# Patient Record
Sex: MALE | Race: WHITE | Employment: FULL TIME | URBAN - NONMETROPOLITAN AREA
[De-identification: names, ages, dates, MRNs, and addresses within clinical notes are randomized per-mention and may not be internally consistent; named-entity substitution may affect disease eponyms.]

---

## 2019-04-29 NOTE — PROGRESS NOTES
PAT call attempted patient unavailable left message with instructions    NPO after midnight  Bring insurance info and drivers license  Wear comfortable clean clothing  Do not bring jewelry   Shower night before and morning of surgery with a liquid antibacterial soap  Bring medications in original bottles  Follow all instructions give by your physician   needed at discharge  Call [phone] for any questions

## 2019-05-01 NOTE — PROGRESS NOTES
Received pre op testing results ; I left message for MA making sure Dr. Chi Barr was aware of A1C of 12.0 4/22/19. Thank you.

## 2019-05-04 NOTE — H&P
[address] [id number]                       PREOPERATIVE HISTORY AND PHYSICAL    PATIENT NAME: Lenox Fothergill                      :        1975  MED REC NO:   [id number]                           ROOM:  ACCOUNT NO:   [de-identified]                           ADMIT DATE: 2019  PROVIDER:     Leanna Winn PA-C    This is a patient of Dr. Uvaldo Charles, who will be undergoing surgery on  2019 at [address]. He will undergoing anterior  cervical discectomy and inner body arthroplasty of C6-C7. CHIEF COMPLAINT:  Cervical pian. HISTORY OF PRESENT ILLNESS:  The patient is a 70-year-old male, who  presented to the office with symptoms of neck pain that radiates into  the shoulder and posterior upper arm, down the forearm into the hand,  and left hand side. He is right-hand dominant, but uses the left hand  very frequently. He has noticed decrease in his functional abilities  with these left upper extremity symptoms. He does report associated  numbness and tingling and pain, as well as weakness with picking up his  children. He was worked up with an MRI of the cervical spine, which did  show a large broad-based midline and para-midline disk herniation at  C6-7, more prominent on the left, which causes severe narrowing of  spinal canal, impingement of the left C7 nerve root. He has failed  outpatient conservative therapies, which have included heat, ice,  physical therapy, back and neck exercises, chiropractic care  interaction. At this time, he is electing to undergo operative  management. He is a diabetic and has been medically cleared by Dr. Norma Linn to undergo this operation. PAST MEDICAL HISTORY:  Significant for high blood pressure and diabetes. MEDICATIONS:  Pioglitazone. PAST SURGICAL HISTORY:  ACL reconstruction in .     SOCIAL HISTORY:  States he is a former smoker, having quit forward for the patient is for him to undergo  anterior cervical diskectomy and interbody arthroplasty at the level of  C6-7. This will be done on 05/06/2019 at [facility]. CONCLUSION:  The patient is a 61-year-old male, who presented to our  office with symptoms of neck pain that radiates into the left upper  extremity with significant pain and loss of function. He does exhibit  some weakness with left-sided  and he is having difficulties  performing his daily activities. He has failed outpatient conservative  therapies and is now electing to undergo operative management. He has  been medically cleared by Dr. Sandra Adan to do so. We have received informed consent from the patient. We have gone over  the risks and benefits of surgery, which include postoperative pain,  blood loss, wound infection,  hoarseness, difficulty with anesthesia, and difficulty swallowing. We have answered the patient's questions and concerns to his  satisfaction. Absolutely no guarantees have been made for as far as  the results of the surgery.         Bimal Pimentel PA-C    D: 05/03/2019 9:29:42       T: 05/03/2019 11:06:57     KINGSTON/MONSERRAT_PRAMOD  Job#: [id number]     Doc#: [id number]    CC:

## 2019-05-06 ENCOUNTER — APPOINTMENT (OUTPATIENT)
Dept: CT IMAGING | Age: 44
DRG: 472 | End: 2019-05-06
Attending: ORTHOPAEDIC SURGERY
Payer: COMMERCIAL

## 2019-05-06 ENCOUNTER — ANESTHESIA (OUTPATIENT)
Dept: OPERATING ROOM | Age: 44
DRG: 472 | End: 2019-05-06
Payer: COMMERCIAL

## 2019-05-06 ENCOUNTER — HOSPITAL ENCOUNTER (INPATIENT)
Age: 44
LOS: 1 days | Discharge: HOME HEALTH | DRG: 472 | End: 2019-05-07
Attending: ORTHOPAEDIC SURGERY | Admitting: ORTHOPAEDIC SURGERY
Payer: COMMERCIAL

## 2019-05-06 ENCOUNTER — APPOINTMENT (OUTPATIENT)
Dept: GENERAL RADIOLOGY | Age: 44
DRG: 472 | End: 2019-05-06
Attending: ORTHOPAEDIC SURGERY
Payer: COMMERCIAL

## 2019-05-06 ENCOUNTER — ANESTHESIA EVENT (OUTPATIENT)
Dept: OPERATING ROOM | Age: 44
DRG: 472 | End: 2019-05-06
Payer: COMMERCIAL

## 2019-05-06 VITALS
OXYGEN SATURATION: 94 % | TEMPERATURE: 96.8 F | DIASTOLIC BLOOD PRESSURE: 71 MMHG | SYSTOLIC BLOOD PRESSURE: 113 MMHG | RESPIRATION RATE: 1 BRPM

## 2019-05-06 DIAGNOSIS — Z98.1 S/P CERVICAL SPINAL FUSION: Primary | ICD-10-CM

## 2019-05-06 PROBLEM — M48.02 CERVICAL SPINAL STENOSIS: Status: ACTIVE | Noted: 2019-05-06

## 2019-05-06 LAB
ABO: NORMAL
ANTIBODY SCREEN: NORMAL
AVERAGE GLUCOSE: 267 MG/DL (ref 70–126)
GLUCOSE BLD-MCNC: 236 MG/DL (ref 70–108)
GLUCOSE BLD-MCNC: 268 MG/DL (ref 70–108)
GLUCOSE BLD-MCNC: 279 MG/DL (ref 70–108)
GLUCOSE BLD-MCNC: 299 MG/DL (ref 70–108)
GLUCOSE BLD-MCNC: 313 MG/DL (ref 70–108)
HBA1C MFR BLD: 10.9 % (ref 4.4–6.4)
RH FACTOR: NORMAL

## 2019-05-06 PROCEDURE — 72020 X-RAY EXAM OF SPINE 1 VIEW: CPT

## 2019-05-06 PROCEDURE — 6370000000 HC RX 637 (ALT 250 FOR IP)

## 2019-05-06 PROCEDURE — 1200000000 HC SEMI PRIVATE

## 2019-05-06 PROCEDURE — 2500000003 HC RX 250 WO HCPCS: Performed by: REGISTERED NURSE

## 2019-05-06 PROCEDURE — L0120 CERV FLEX N/ADJ FOAM PRE OTS: HCPCS

## 2019-05-06 PROCEDURE — C1713 ANCHOR/SCREW BN/BN,TIS/BN: HCPCS | Performed by: ORTHOPAEDIC SURGERY

## 2019-05-06 PROCEDURE — 0RG10A0 FUSION OF CERVICAL VERTEBRAL JOINT WITH INTERBODY FUSION DEVICE, ANTERIOR APPROACH, ANTERIOR COLUMN, OPEN APPROACH: ICD-10-PCS | Performed by: ORTHOPAEDIC SURGERY

## 2019-05-06 PROCEDURE — 7100000001 HC PACU RECOVERY - ADDTL 15 MIN: Performed by: ORTHOPAEDIC SURGERY

## 2019-05-06 PROCEDURE — 3700000001 HC ADD 15 MINUTES (ANESTHESIA): Performed by: ORTHOPAEDIC SURGERY

## 2019-05-06 PROCEDURE — 0RB30ZZ EXCISION OF CERVICAL VERTEBRAL DISC, OPEN APPROACH: ICD-10-PCS | Performed by: ORTHOPAEDIC SURGERY

## 2019-05-06 PROCEDURE — 2720000010 HC SURG SUPPLY STERILE: Performed by: ORTHOPAEDIC SURGERY

## 2019-05-06 PROCEDURE — 86901 BLOOD TYPING SEROLOGIC RH(D): CPT

## 2019-05-06 PROCEDURE — 83036 HEMOGLOBIN GLYCOSYLATED A1C: CPT

## 2019-05-06 PROCEDURE — L0120 CERV FLEX N/ADJ FOAM PRE OTS: HCPCS | Performed by: ORTHOPAEDIC SURGERY

## 2019-05-06 PROCEDURE — 2709999900 HC NON-CHARGEABLE SUPPLY: Performed by: ORTHOPAEDIC SURGERY

## 2019-05-06 PROCEDURE — 82948 REAGENT STRIP/BLOOD GLUCOSE: CPT

## 2019-05-06 PROCEDURE — 6360000002 HC RX W HCPCS: Performed by: PHYSICIAN ASSISTANT

## 2019-05-06 PROCEDURE — 6360000002 HC RX W HCPCS

## 2019-05-06 PROCEDURE — 2500000003 HC RX 250 WO HCPCS: Performed by: ORTHOPAEDIC SURGERY

## 2019-05-06 PROCEDURE — 2580000003 HC RX 258: Performed by: PHYSICIAN ASSISTANT

## 2019-05-06 PROCEDURE — 36415 COLL VENOUS BLD VENIPUNCTURE: CPT

## 2019-05-06 PROCEDURE — 86900 BLOOD TYPING SEROLOGIC ABO: CPT

## 2019-05-06 PROCEDURE — 3209999900 FLUORO FOR SURGICAL PROCEDURES

## 2019-05-06 PROCEDURE — 3700000000 HC ANESTHESIA ATTENDED CARE: Performed by: ORTHOPAEDIC SURGERY

## 2019-05-06 PROCEDURE — 2700000000 HC OXYGEN THERAPY PER DAY

## 2019-05-06 PROCEDURE — 2780000010 HC IMPLANT OTHER: Performed by: ORTHOPAEDIC SURGERY

## 2019-05-06 PROCEDURE — 2580000003 HC RX 258: Performed by: ORTHOPAEDIC SURGERY

## 2019-05-06 PROCEDURE — 6370000000 HC RX 637 (ALT 250 FOR IP): Performed by: ORTHOPAEDIC SURGERY

## 2019-05-06 PROCEDURE — 86850 RBC ANTIBODY SCREEN: CPT

## 2019-05-06 PROCEDURE — 7100000000 HC PACU RECOVERY - FIRST 15 MIN: Performed by: ORTHOPAEDIC SURGERY

## 2019-05-06 PROCEDURE — 3600000014 HC SURGERY LEVEL 4 ADDTL 15MIN: Performed by: ORTHOPAEDIC SURGERY

## 2019-05-06 PROCEDURE — 6360000002 HC RX W HCPCS: Performed by: REGISTERED NURSE

## 2019-05-06 PROCEDURE — 72125 CT NECK SPINE W/O DYE: CPT

## 2019-05-06 PROCEDURE — 3600000004 HC SURGERY LEVEL 4 BASE: Performed by: ORTHOPAEDIC SURGERY

## 2019-05-06 PROCEDURE — 99253 IP/OBS CNSLTJ NEW/EST LOW 45: CPT | Performed by: NURSE PRACTITIONER

## 2019-05-06 PROCEDURE — 6370000000 HC RX 637 (ALT 250 FOR IP): Performed by: PHYSICIAN ASSISTANT

## 2019-05-06 PROCEDURE — 6370000000 HC RX 637 (ALT 250 FOR IP): Performed by: ANESTHESIOLOGY

## 2019-05-06 DEVICE — GRAFT BNE SUB W11XH7XL14MM CORT INTBDY FUS FRZ DRY BLK SPCR: Type: IMPLANTABLE DEVICE | Site: NECK | Status: FUNCTIONAL

## 2019-05-06 DEVICE — SCREW 3120515 4.0 X 15 SELF DRILL VAR
Type: IMPLANTABLE DEVICE | Site: NECK | Status: FUNCTIONAL
Brand: ATLANTIS® ANTERIOR CERVICAL PLATE SYSTEM

## 2019-05-06 DEVICE — DBM T41120 1CC GRAFTON GEL
Type: IMPLANTABLE DEVICE | Site: NECK | Status: FUNCTIONAL
Brand: GRAFTON®AND GRAFTON PLUS®DEMINERALIZED BONE MATRIX (DBM)

## 2019-05-06 RX ORDER — ONDANSETRON 2 MG/ML
4 INJECTION INTRAMUSCULAR; INTRAVENOUS EVERY 6 HOURS PRN
Status: DISCONTINUED | OUTPATIENT
Start: 2019-05-06 | End: 2019-05-07 | Stop reason: HOSPADM

## 2019-05-06 RX ORDER — CYCLOBENZAPRINE HCL 10 MG
10 TABLET ORAL 3 TIMES DAILY PRN
Status: DISCONTINUED | OUTPATIENT
Start: 2019-05-06 | End: 2019-05-07 | Stop reason: HOSPADM

## 2019-05-06 RX ORDER — OXYCODONE HYDROCHLORIDE AND ACETAMINOPHEN 5; 325 MG/1; MG/1
1 TABLET ORAL EVERY 4 HOURS PRN
Status: DISCONTINUED | OUTPATIENT
Start: 2019-05-06 | End: 2019-05-07 | Stop reason: HOSPADM

## 2019-05-06 RX ORDER — DOCUSATE SODIUM 100 MG/1
100 CAPSULE, LIQUID FILLED ORAL 2 TIMES DAILY
Status: DISCONTINUED | OUTPATIENT
Start: 2019-05-06 | End: 2019-05-07 | Stop reason: HOSPADM

## 2019-05-06 RX ORDER — MORPHINE SULFATE 2 MG/ML
2 INJECTION, SOLUTION INTRAMUSCULAR; INTRAVENOUS EVERY 5 MIN PRN
Status: DISCONTINUED | OUTPATIENT
Start: 2019-05-06 | End: 2019-05-06 | Stop reason: HOSPADM

## 2019-05-06 RX ORDER — PIOGLITAZONEHYDROCHLORIDE 30 MG/1
30 TABLET ORAL DAILY
Status: DISCONTINUED | OUTPATIENT
Start: 2019-05-06 | End: 2019-05-07 | Stop reason: HOSPADM

## 2019-05-06 RX ORDER — SODIUM CHLORIDE 0.9 % (FLUSH) 0.9 %
10 SYRINGE (ML) INJECTION EVERY 12 HOURS SCHEDULED
Status: DISCONTINUED | OUTPATIENT
Start: 2019-05-06 | End: 2019-05-06 | Stop reason: HOSPADM

## 2019-05-06 RX ORDER — SODIUM CHLORIDE 9 MG/ML
INJECTION, SOLUTION INTRAVENOUS CONTINUOUS
Status: DISCONTINUED | OUTPATIENT
Start: 2019-05-06 | End: 2019-05-07 | Stop reason: HOSPADM

## 2019-05-06 RX ORDER — DEXTROSE MONOHYDRATE 50 MG/ML
100 INJECTION, SOLUTION INTRAVENOUS PRN
Status: DISCONTINUED | OUTPATIENT
Start: 2019-05-06 | End: 2019-05-07 | Stop reason: HOSPADM

## 2019-05-06 RX ORDER — MIDAZOLAM HYDROCHLORIDE 1 MG/ML
INJECTION INTRAMUSCULAR; INTRAVENOUS PRN
Status: DISCONTINUED | OUTPATIENT
Start: 2019-05-06 | End: 2019-05-06 | Stop reason: SDUPTHER

## 2019-05-06 RX ORDER — SODIUM CHLORIDE 0.9 % (FLUSH) 0.9 %
10 SYRINGE (ML) INJECTION PRN
Status: DISCONTINUED | OUTPATIENT
Start: 2019-05-06 | End: 2019-05-06 | Stop reason: HOSPADM

## 2019-05-06 RX ORDER — NEOSTIGMINE METHYLSULFATE 5 MG/5 ML
SYRINGE (ML) INTRAVENOUS PRN
Status: DISCONTINUED | OUTPATIENT
Start: 2019-05-06 | End: 2019-05-06 | Stop reason: SDUPTHER

## 2019-05-06 RX ORDER — SODIUM CHLORIDE 0.9 % (FLUSH) 0.9 %
10 SYRINGE (ML) INJECTION EVERY 12 HOURS SCHEDULED
Status: DISCONTINUED | OUTPATIENT
Start: 2019-05-06 | End: 2019-05-07 | Stop reason: HOSPADM

## 2019-05-06 RX ORDER — NICOTINE POLACRILEX 4 MG
15 LOZENGE BUCCAL PRN
Status: DISCONTINUED | OUTPATIENT
Start: 2019-05-06 | End: 2019-05-07 | Stop reason: HOSPADM

## 2019-05-06 RX ORDER — PIOGLITAZONEHYDROCHLORIDE 30 MG/1
30 TABLET ORAL DAILY
COMMUNITY

## 2019-05-06 RX ORDER — PROPOFOL 10 MG/ML
INJECTION, EMULSION INTRAVENOUS PRN
Status: DISCONTINUED | OUTPATIENT
Start: 2019-05-06 | End: 2019-05-06 | Stop reason: SDUPTHER

## 2019-05-06 RX ORDER — ROCURONIUM BROMIDE 10 MG/ML
INJECTION, SOLUTION INTRAVENOUS PRN
Status: DISCONTINUED | OUTPATIENT
Start: 2019-05-06 | End: 2019-05-06 | Stop reason: SDUPTHER

## 2019-05-06 RX ORDER — LABETALOL HYDROCHLORIDE 5 MG/ML
10 INJECTION, SOLUTION INTRAVENOUS EVERY 10 MIN PRN
Status: DISCONTINUED | OUTPATIENT
Start: 2019-05-06 | End: 2019-05-06 | Stop reason: HOSPADM

## 2019-05-06 RX ORDER — GLYCOPYRROLATE 1 MG/5 ML
SYRINGE (ML) INTRAVENOUS PRN
Status: DISCONTINUED | OUTPATIENT
Start: 2019-05-06 | End: 2019-05-06 | Stop reason: SDUPTHER

## 2019-05-06 RX ORDER — BISACODYL 10 MG
10 SUPPOSITORY, RECTAL RECTAL DAILY PRN
Status: DISCONTINUED | OUTPATIENT
Start: 2019-05-06 | End: 2019-05-07 | Stop reason: HOSPADM

## 2019-05-06 RX ORDER — SODIUM CHLORIDE 0.9 % (FLUSH) 0.9 %
10 SYRINGE (ML) INJECTION PRN
Status: DISCONTINUED | OUTPATIENT
Start: 2019-05-06 | End: 2019-05-07 | Stop reason: HOSPADM

## 2019-05-06 RX ORDER — POLYETHYLENE GLYCOL 3350 17 G/17G
17 POWDER, FOR SOLUTION ORAL DAILY PRN
Status: DISCONTINUED | OUTPATIENT
Start: 2019-05-06 | End: 2019-05-07 | Stop reason: HOSPADM

## 2019-05-06 RX ORDER — ACETAMINOPHEN,DIPHENHYDRAMINE HCL 500; 25 MG/1; MG/1
1 TABLET, FILM COATED ORAL NIGHTLY PRN
COMMUNITY

## 2019-05-06 RX ORDER — DEXAMETHASONE SODIUM PHOSPHATE 4 MG/ML
INJECTION, SOLUTION INTRA-ARTICULAR; INTRALESIONAL; INTRAMUSCULAR; INTRAVENOUS; SOFT TISSUE PRN
Status: DISCONTINUED | OUTPATIENT
Start: 2019-05-06 | End: 2019-05-06 | Stop reason: SDUPTHER

## 2019-05-06 RX ORDER — SODIUM CHLORIDE 9 MG/ML
INJECTION, SOLUTION INTRAVENOUS CONTINUOUS
Status: DISCONTINUED | OUTPATIENT
Start: 2019-05-06 | End: 2019-05-06

## 2019-05-06 RX ORDER — FENTANYL CITRATE 50 UG/ML
INJECTION, SOLUTION INTRAMUSCULAR; INTRAVENOUS PRN
Status: DISCONTINUED | OUTPATIENT
Start: 2019-05-06 | End: 2019-05-06 | Stop reason: SDUPTHER

## 2019-05-06 RX ORDER — ONDANSETRON 2 MG/ML
INJECTION INTRAMUSCULAR; INTRAVENOUS PRN
Status: DISCONTINUED | OUTPATIENT
Start: 2019-05-06 | End: 2019-05-06 | Stop reason: SDUPTHER

## 2019-05-06 RX ORDER — PROPOFOL 10 MG/ML
INJECTION, EMULSION INTRAVENOUS CONTINUOUS PRN
Status: DISCONTINUED | OUTPATIENT
Start: 2019-05-06 | End: 2019-05-06 | Stop reason: SDUPTHER

## 2019-05-06 RX ORDER — DEXTROSE MONOHYDRATE 25 G/50ML
12.5 INJECTION, SOLUTION INTRAVENOUS PRN
Status: DISCONTINUED | OUTPATIENT
Start: 2019-05-06 | End: 2019-05-07 | Stop reason: HOSPADM

## 2019-05-06 RX ORDER — LIDOCAINE HYDROCHLORIDE 20 MG/ML
INJECTION, SOLUTION INTRAVENOUS PRN
Status: DISCONTINUED | OUTPATIENT
Start: 2019-05-06 | End: 2019-05-06 | Stop reason: SDUPTHER

## 2019-05-06 RX ORDER — ACETAMINOPHEN 325 MG/1
650 TABLET ORAL EVERY 4 HOURS PRN
Status: DISCONTINUED | OUTPATIENT
Start: 2019-05-06 | End: 2019-05-07 | Stop reason: HOSPADM

## 2019-05-06 RX ORDER — FENTANYL CITRATE 50 UG/ML
50 INJECTION, SOLUTION INTRAMUSCULAR; INTRAVENOUS EVERY 5 MIN PRN
Status: DISCONTINUED | OUTPATIENT
Start: 2019-05-06 | End: 2019-05-06 | Stop reason: HOSPADM

## 2019-05-06 RX ORDER — OXYCODONE HYDROCHLORIDE AND ACETAMINOPHEN 5; 325 MG/1; MG/1
2 TABLET ORAL EVERY 4 HOURS PRN
Status: DISCONTINUED | OUTPATIENT
Start: 2019-05-06 | End: 2019-05-07 | Stop reason: HOSPADM

## 2019-05-06 RX ADMIN — MIDAZOLAM HYDROCHLORIDE 2 MG: 1 INJECTION, SOLUTION INTRAMUSCULAR; INTRAVENOUS at 07:19

## 2019-05-06 RX ADMIN — FENTANYL CITRATE 50 MCG: 50 INJECTION INTRAMUSCULAR; INTRAVENOUS at 07:47

## 2019-05-06 RX ADMIN — Medication 1 MG: at 08:55

## 2019-05-06 RX ADMIN — FENTANYL CITRATE 50 MCG: 50 INJECTION INTRAMUSCULAR; INTRAVENOUS at 07:30

## 2019-05-06 RX ADMIN — INSULIN HUMAN 4 UNITS: 100 INJECTION, SOLUTION PARENTERAL at 09:27

## 2019-05-06 RX ADMIN — PROPOFOL 200 MG: 10 INJECTION, EMULSION INTRAVENOUS at 07:23

## 2019-05-06 RX ADMIN — FENTANYL CITRATE 50 MCG: 50 INJECTION INTRAMUSCULAR; INTRAVENOUS at 08:11

## 2019-05-06 RX ADMIN — ONDANSETRON HYDROCHLORIDE 4 MG: 4 INJECTION, SOLUTION INTRAMUSCULAR; INTRAVENOUS at 08:42

## 2019-05-06 RX ADMIN — OXYCODONE HYDROCHLORIDE AND ACETAMINOPHEN 2 TABLET: 5; 325 TABLET ORAL at 14:42

## 2019-05-06 RX ADMIN — INSULIN LISPRO 3 UNITS: 100 INJECTION, SOLUTION INTRAVENOUS; SUBCUTANEOUS at 21:12

## 2019-05-06 RX ADMIN — PROPOFOL 100 MCG/KG/MIN: 10 INJECTION, EMULSION INTRAVENOUS at 07:40

## 2019-05-06 RX ADMIN — ROCURONIUM BROMIDE 10 MG: 10 INJECTION INTRAVENOUS at 08:00

## 2019-05-06 RX ADMIN — ONDANSETRON 4 MG: 2 INJECTION INTRAMUSCULAR; INTRAVENOUS at 23:11

## 2019-05-06 RX ADMIN — Medication 1 MG: at 09:35

## 2019-05-06 RX ADMIN — DEXAMETHASONE SODIUM PHOSPHATE 8 MG: 4 INJECTION, SOLUTION INTRAMUSCULAR; INTRAVENOUS at 08:45

## 2019-05-06 RX ADMIN — DEXTROSE MONOHYDRATE 3 G: 50 INJECTION, SOLUTION INTRAVENOUS at 14:45

## 2019-05-06 RX ADMIN — SODIUM CHLORIDE: 9 INJECTION, SOLUTION INTRAVENOUS at 14:57

## 2019-05-06 RX ADMIN — Medication 0.2 MG: at 08:55

## 2019-05-06 RX ADMIN — ROCURONIUM BROMIDE 10 MG: 10 INJECTION INTRAVENOUS at 07:40

## 2019-05-06 RX ADMIN — OXYCODONE HYDROCHLORIDE AND ACETAMINOPHEN 2 TABLET: 5; 325 TABLET ORAL at 19:09

## 2019-05-06 RX ADMIN — LIDOCAINE HYDROCHLORIDE 100 MG: 20 INJECTION, SOLUTION INTRAVENOUS at 07:23

## 2019-05-06 RX ADMIN — HYDROMORPHONE HYDROCHLORIDE 1 MG: 1 INJECTION, SOLUTION INTRAMUSCULAR; INTRAVENOUS; SUBCUTANEOUS at 09:35

## 2019-05-06 RX ADMIN — DOCUSATE SODIUM 100 MG: 100 CAPSULE, LIQUID FILLED ORAL at 14:42

## 2019-05-06 RX ADMIN — INSULIN LISPRO 8 UNITS: 100 INJECTION, SOLUTION INTRAVENOUS; SUBCUTANEOUS at 17:27

## 2019-05-06 RX ADMIN — SODIUM CHLORIDE: 9 INJECTION, SOLUTION INTRAVENOUS at 06:21

## 2019-05-06 RX ADMIN — Medication 3 G: at 07:27

## 2019-05-06 RX ADMIN — ROCURONIUM BROMIDE 40 MG: 10 INJECTION INTRAVENOUS at 07:23

## 2019-05-06 RX ADMIN — Medication 6 UNITS: at 06:52

## 2019-05-06 RX ADMIN — FENTANYL CITRATE 100 MCG: 50 INJECTION INTRAMUSCULAR; INTRAVENOUS at 07:23

## 2019-05-06 RX ADMIN — SODIUM CHLORIDE: 9 INJECTION, SOLUTION INTRAVENOUS at 08:41

## 2019-05-06 RX ADMIN — DEXTROSE MONOHYDRATE 3 G: 50 INJECTION, SOLUTION INTRAVENOUS at 23:11

## 2019-05-06 RX ADMIN — PIOGLITAZONE 30 MG: 30 TABLET ORAL at 17:26

## 2019-05-06 RX ADMIN — DEXAMETHASONE SODIUM PHOSPHATE 4 MG: 4 INJECTION, SOLUTION INTRAMUSCULAR; INTRAVENOUS at 07:27

## 2019-05-06 ASSESSMENT — PULMONARY FUNCTION TESTS
PIF_VALUE: 28
PIF_VALUE: 29
PIF_VALUE: 28
PIF_VALUE: 33
PIF_VALUE: 2
PIF_VALUE: 30
PIF_VALUE: 32
PIF_VALUE: 7
PIF_VALUE: 28
PIF_VALUE: 23
PIF_VALUE: 24
PIF_VALUE: 20
PIF_VALUE: 34
PIF_VALUE: 25
PIF_VALUE: 27
PIF_VALUE: 34
PIF_VALUE: 27
PIF_VALUE: 1
PIF_VALUE: 33
PIF_VALUE: 2
PIF_VALUE: 33
PIF_VALUE: 26
PIF_VALUE: 29
PIF_VALUE: 33
PIF_VALUE: 1
PIF_VALUE: 33
PIF_VALUE: 34
PIF_VALUE: 32
PIF_VALUE: 1
PIF_VALUE: 2
PIF_VALUE: 33
PIF_VALUE: 33
PIF_VALUE: 29
PIF_VALUE: 33
PIF_VALUE: 28
PIF_VALUE: 33
PIF_VALUE: 32
PIF_VALUE: 27
PIF_VALUE: 1
PIF_VALUE: 2
PIF_VALUE: 33
PIF_VALUE: 27
PIF_VALUE: 33
PIF_VALUE: 32
PIF_VALUE: 29
PIF_VALUE: 33
PIF_VALUE: 33
PIF_VALUE: 27
PIF_VALUE: 27
PIF_VALUE: 32
PIF_VALUE: 32
PIF_VALUE: 35
PIF_VALUE: 33
PIF_VALUE: 30
PIF_VALUE: 34
PIF_VALUE: 34
PIF_VALUE: 27
PIF_VALUE: 33
PIF_VALUE: 34
PIF_VALUE: 1
PIF_VALUE: 34
PIF_VALUE: 33
PIF_VALUE: 25
PIF_VALUE: 29
PIF_VALUE: 34
PIF_VALUE: 34
PIF_VALUE: 33
PIF_VALUE: 29
PIF_VALUE: 34
PIF_VALUE: 33
PIF_VALUE: 31
PIF_VALUE: 27
PIF_VALUE: 26
PIF_VALUE: 28
PIF_VALUE: 10
PIF_VALUE: 34
PIF_VALUE: 1
PIF_VALUE: 33
PIF_VALUE: 27
PIF_VALUE: 32
PIF_VALUE: 34
PIF_VALUE: 33
PIF_VALUE: 33
PIF_VALUE: 32
PIF_VALUE: 28
PIF_VALUE: 27
PIF_VALUE: 30
PIF_VALUE: 34
PIF_VALUE: 32
PIF_VALUE: 34
PIF_VALUE: 27
PIF_VALUE: 33
PIF_VALUE: 25
PIF_VALUE: 27
PIF_VALUE: 28
PIF_VALUE: 9
PIF_VALUE: 33
PIF_VALUE: 34
PIF_VALUE: 32
PIF_VALUE: 33

## 2019-05-06 ASSESSMENT — PAIN SCALES - GENERAL
PAINLEVEL_OUTOF10: 0
PAINLEVEL_OUTOF10: 5
PAINLEVEL_OUTOF10: 8
PAINLEVEL_OUTOF10: 4
PAINLEVEL_OUTOF10: 8
PAINLEVEL_OUTOF10: 5
PAINLEVEL_OUTOF10: 7
PAINLEVEL_OUTOF10: 5
PAINLEVEL_OUTOF10: 7
PAINLEVEL_OUTOF10: 0

## 2019-05-06 ASSESSMENT — PAIN DESCRIPTION - FREQUENCY
FREQUENCY: CONTINUOUS
FREQUENCY: CONTINUOUS

## 2019-05-06 ASSESSMENT — PAIN DESCRIPTION - LOCATION
LOCATION: NECK;THROAT;SHOULDER
LOCATION: NECK;THROAT
LOCATION: NECK

## 2019-05-06 ASSESSMENT — PAIN DESCRIPTION - ONSET
ONSET: ON-GOING
ONSET: ON-GOING

## 2019-05-06 ASSESSMENT — PAIN DESCRIPTION - PROGRESSION
CLINICAL_PROGRESSION: NOT CHANGED
CLINICAL_PROGRESSION: NOT CHANGED
CLINICAL_PROGRESSION: GRADUALLY IMPROVING
CLINICAL_PROGRESSION: NOT CHANGED
CLINICAL_PROGRESSION: NOT CHANGED

## 2019-05-06 ASSESSMENT — PAIN DESCRIPTION - PAIN TYPE
TYPE: SURGICAL PAIN

## 2019-05-06 ASSESSMENT — PAIN DESCRIPTION - DESCRIPTORS
DESCRIPTORS: ACHING;DISCOMFORT;SORE
DESCRIPTORS: DISCOMFORT;SORE

## 2019-05-06 ASSESSMENT — PAIN DESCRIPTION - ORIENTATION
ORIENTATION: ANTERIOR
ORIENTATION: ANTERIOR;INNER

## 2019-05-06 NOTE — PROGRESS NOTES
Post op Note:  Pt resting in PACU. Doing well. Swallowing and breathing without difficulty. Drain working properly. No complaints of tingling/ pain in UE. 5/5 b/l . Pedal push/pull intact. Pain controlled.

## 2019-05-06 NOTE — CONSULTS
H[facility] of [facility]      Patient:  Tee Zee  YOB: 1975    MRN: [id number]     Acct: [de-identified]    Primary Care Physician: Jordi Vasquez MD    Reason for consult: management of DM    HISTORY OF PRESENT ILLNESS:   History obtained from chart review and the patient. The patient is a 41 y.o. male whom I have been requested to see by Dr. Maye Quintana for evaluation of DM. He recently started taking Actos as he had issues with Glucophage; he states his sugars have been on the higher side lately; today he had C spine surgery; he is sitting up; just finished eating; he denies dyspnea, nausea; relates to a mild sore throat; wife at bedside; he denies chest pain. Past Medical History:        Diagnosis Date    Diabetes mellitus ([address]        Past Surgical History:        Procedure Laterality Date    ANTERIOR CRUCIATE LIGAMENT REPAIR Left 2003       Medications: Scheduled Meds:   pioglitazone  30 mg Oral Daily    sodium chloride flush  10 mL Intravenous 2 times per day    docusate sodium  100 mg Oral BID    ceFAZolin (ANCEF) IVPB  3 g Intravenous Q8H    insulin lispro  0-12 Units Subcutaneous TID WC    insulin lispro  0-6 Units Subcutaneous Nightly     Continuous Infusions:   sodium chloride      dextrose       PRN Meds:.sodium chloride flush, ondansetron, acetaminophen, oxyCODONE-acetaminophen **OR** oxyCODONE-acetaminophen, HYDROmorphone **OR** HYDROmorphone, cyclobenzaprine, polyethylene glycol, bisacodyl, glucose, dextrose, glucagon (rDNA), dextrose    Allergies:  Patient has no known allergies. Social History:    reports that he has never smoked. He has never used smokeless tobacco. He reports that he does not use drugs. Recent Travel: none    Family History:   History reviewed. No pertinent family history.       Review of Systems:  Constitutional: ROS: negative for - chills or fever  Head: no headache, no head injury, no migraine   Eyes ROS: denies blurred/double vision  Ears ROS: no hearing difficulty, no tinnitus  Mouth and Throat ROS: no ulceration, dysphagia, dental caries  Psychological ROS: no depression, no anxiety, no panic attacks, denies suicide/homicide ideation  Endocrine ROS: denies polyuria, polydypsia, no heat or cold intolerance  Respiratory ROS: no cough, shortness of breath, or wheezing  Cardiovascular ROS: no chest pain or dyspnea on exertion  Gastrointestinal ROS: no abdominal pain, change in bowel habits, or black or bloody stools  Genito-Urinary ROS: denies dysuria, frequency, urgency; denies hematuria  Musculoskeletal ROS: negative  Neurological ROS: no syncope, no seizures, no numbness or tingling of hands, no numbness or tingling of feet, no paresis  Dermatology: no skin rash, no eczema  Endocrine: no polyuria, polydypsia, no heat/cold intolerance  Hematology: denies bruising easily, denies bleeding problems, denies clotting disorders            Physical Exam:    Vitals:  Patient Vitals for the past 24 hrs:   BP Temp Temp src Pulse Resp SpO2 Height Weight   05/06/19 1100 117/69 -- -- 71 18 92 % -- --   05/06/19 1045 127/70 -- -- 75 18 91 % -- --   05/06/19 1030 129/75 -- -- 70 16 92 % -- --   05/06/19 1015 126/70 97.8 °F (36.6 °C) Oral 72 16 91 % -- --   05/06/19 1010 126/73 -- -- 66 23 92 % -- --   05/06/19 1005 126/73 -- -- 71 20 92 % -- --   05/06/19 1000 124/72 -- -- 70 15 92 % -- --   05/06/19 0955 118/72 -- -- 73 22 93 % -- --   05/06/19 0950 123/76 -- -- 74 12 96 % -- --   05/06/19 0945 123/73 -- -- 65 15 95 % -- --   05/06/19 0940 133/75 -- -- 72 14 95 % -- --   05/06/19 0935 131/75 -- -- 67 17 96 % -- --   05/06/19 0930 135/70 -- -- 69 16 95 % -- --   05/06/19 0925 131/73 -- -- 74 26 95 % -- --   05/06/19 0920 125/71 -- -- 74 25 94 % -- --   05/06/19 0915 118/68 -- -- 74 23 93 % -- --   05/06/19 0910 117/66 -- -- 78 17 -- -- --   05/06/19 0903 121/66 98.3 °F (36.8 °C) Temporal 83 20 95 % -- --   05/06/19 0555 -- -- -- -- -- -- 5' 7\" (1.702 m) 285 lb (129.3 kg)   05/06/19 0549 (!) 159/90 97.4 °F (36.3 °C) -- 79 16 94 % -- --     Weight: Weight: 285 lb (129.3 kg)     24 hour intake/output:    Intake/Output Summary (Last 24 hours) at 5/6/2019 1322  Last data filed at 5/6/2019 1250  Gross per 24 hour   Intake 2120.75 ml   Output 615 ml   Net 1505.75 ml       General appearance - alert, well appearing, and in no distress, oriented to person, place, and time and overweight; appears stated age  HEENT-atraumatic, normocephalic; PERRL; conjunctiva pink; sclera anicteric; oral mucosa pink and moist; trachea midline; neck supple; no carotid bruit auscultated; no JVD  Respiratory - clear to auscultation, no wheezes, rales or rhonchi, symmetric air entry  Cardiovascular - normal rate and regular rhythm  Gastrointestinal - soft, nontender, nondistended, active bowel sounds x 4  Obese: Yes  Neurological - alert and oriented to person, place, time; cranial nerves 2-12 grossly intact; speech is clear   Musculoskeletal - no joint tenderness, deformity or swelling, movement of extremities x 4 intact; gait not assessed; no lower extremity edema; pedal and posterior tibialis pulses 2+; C collar in place  Integumentary - pink, warm, dry;      Review of Labs and Diagnostic Testing:    Glucose:  Recent Labs     05/06/19  0618 05/06/19  0918 05/06/19  1154   POCGLU 268* 236* 299*     Problem List:    Patient Active Problem List   Diagnosis    Cervical spinal stenosis         Assessment/Plan:  1. DM-2, uncontrolled--only takes Actos at home; on SSI #2 here; add ADA diet when able to eat; check AIC  2. Cervical spine stenosis S/P C6-C7 ACDF on May 6, 2019  3. Morbid Obesity with BMI 44.7       Thank you Dr. Martel Officer for allowing me to participate in this patient's care.     Kana Gee, CNP

## 2019-05-06 NOTE — ANESTHESIA PRE PROCEDURE
(129.3 kg)   Height:  5' 7\" (1.702 m)                                              BP Readings from Last 3 Encounters:   05/06/19 (!) 159/90       NPO Status: Time of last liquid consumption: 1900                        Time of last solid consumption: 1900                        Date of last liquid consumption: 05/05/19                        Date of last solid food consumption: 05/05/19    BMI:   Wt Readings from Last 3 Encounters:   05/06/19 285 lb (129.3 kg)     Body mass index is 44.64 kg/m². CBC: No results found for: WBC, RBC, HGB, HCT, MCV, RDW, PLT    CMP: No results found for: NA, K, CL, CO2, BUN, CREATININE, GFRAA, AGRATIO, LABGLOM, GLUCOSE, PROT, CALCIUM, BILITOT, ALKPHOS, AST, ALT    POC Tests:   Recent Labs     05/06/19 0618   POCGLU 268*       Coags: No results found for: PROTIME, INR, APTT    HCG (If Applicable): No results found for: PREGTESTUR, PREGSERUM, HCG, HCGQUANT     ABGs: No results found for: PHART, PO2ART, BXU1ZZT, JSE6VXT, BEART, L1UFYRKD     Type & Screen (If Applicable):  No results found for: LABABO, LABRH    Anesthesia Evaluation    Airway: Mallampati: III  TM distance: >3 FB   Neck ROM: full  Mouth opening: > = 3 FB Dental:          Pulmonary: breath sounds clear to auscultation                             Cardiovascular:            Rhythm: regular                      Neuro/Psych:               GI/Hepatic/Renal:   (+) morbid obesity          Endo/Other:    (+) Diabetes, . Abdominal:   (+) obese,         Vascular:                                        Anesthesia Plan      general     ASA 2       Induction: intravenous. MIPS: Postoperative opioids intended and Prophylactic antiemetics administered. Anesthetic plan and risks discussed with patient, spouse and mother. Plan discussed with CRNA.                   Emmy Leach MD   5/6/2019

## 2019-05-06 NOTE — PROGRESS NOTES
Patient return from pacu Lungs clear throughout O2 sats 91  2 liters/min via nasal cannula, bowel sounds diminished abdomen Soft. Iv normal saline infusing into the hand left, condition patent without difficulty with 500ml left in a bag hung in recovery. Buzz-Medina drain(s) with closed bulb suction in the anterior neck. Dressing clean, dry and intact  Patient rates pain at 5 on 1-10 scale. Pain management discussed with patient and possible side effects of pain medication information sheet given to patient. Patient oriented to room and call light system and placed within reach.

## 2019-05-06 NOTE — BRIEF OP NOTE
Brief Postoperative Note  ______________________________________________________________    Patient: Bradley Bowden  YOB: 1975  MRN: [id number]  Date of Procedure: 5/6/2019    Pre-Op Diagnosis: SPINAL STENOSIS OF CERVICAL REGION, CERVICAL DISC DISORDER WITH MYELOPATHY OF MID-CERVICAL REGION, CERVICALGIA    Post-Op Diagnosis: Same       Procedure(s):  C6-C7  ACDF    Anesthesia: General    Surgeon(s):  Romero Nissen, MD    Assistant: Meron Mcclelland    Estimated Blood Loss (mL): 50    Complications: None    Specimens:   * No specimens in log *    Implants:  Implant Name Type Inv.  Item Serial No.  Lot No. LRB No. Used   VILMA-GRAFT CORTCL BLOC [id number] - [id number] Spine VILMA-GRAFT [facility] [id number] [employer] [place] [employer] [id number] N/A 1   PLATE TRACEE ELITE 19OM Spine PLATE TRACEE ELITE [id number]  [employer] [place] [employer]  N/A 1   SCREW 4X15 SELFDRILL BRIANA ANGL Spine SCREW 4X15 SELFDRILL BRIANA ANGL  [employer] [place] [employer]  N/A 4   IMPL SPINE GEL SUSIE [facility] - [id number] Spine IMPL SPINE GEL Pam Beck [id number] Pending sale to [facility]  N/A 1         Drains:   Closed/Suction Drain Anterior Neck Bulb 7 [place] (Active)       Findings: same    Romero Nissen, MD  Date: 5/6/2019  Time: 8:50 AM

## 2019-05-07 VITALS
HEART RATE: 76 BPM | RESPIRATION RATE: 20 BRPM | HEIGHT: 67 IN | TEMPERATURE: 97.8 F | BODY MASS INDEX: 44.73 KG/M2 | WEIGHT: 285 LBS | SYSTOLIC BLOOD PRESSURE: 119 MMHG | OXYGEN SATURATION: 92 % | DIASTOLIC BLOOD PRESSURE: 61 MMHG

## 2019-05-07 LAB
BASOPHILS # BLD: 0.2 %
BASOPHILS ABSOLUTE: 0 THOU/MM3 (ref 0–0.1)
EOSINOPHIL # BLD: 0.1 %
EOSINOPHILS ABSOLUTE: 0 THOU/MM3 (ref 0–0.4)
ERYTHROCYTE [DISTWIDTH] IN BLOOD BY AUTOMATED COUNT: 12.7 % (ref 11.5–14.5)
ERYTHROCYTE [DISTWIDTH] IN BLOOD BY AUTOMATED COUNT: 41.1 FL (ref 35–45)
GLUCOSE BLD-MCNC: 225 MG/DL (ref 70–108)
GLUCOSE BLD-MCNC: 237 MG/DL (ref 70–108)
HCT VFR BLD CALC: 41.7 % (ref 42–52)
HEMOGLOBIN: 13.7 GM/DL (ref 14–18)
IMMATURE GRANS (ABS): 0.05 THOU/MM3 (ref 0–0.07)
IMMATURE GRANULOCYTES: 0.4 %
LYMPHOCYTES # BLD: 17.3 %
LYMPHOCYTES ABSOLUTE: 2.1 THOU/MM3 (ref 1–4.8)
MCH RBC QN AUTO: 29.2 PG (ref 26–33)
MCHC RBC AUTO-ENTMCNC: 32.9 GM/DL (ref 32.2–35.5)
MCV RBC AUTO: 88.9 FL (ref 80–94)
MONOCYTES # BLD: 4.8 %
MONOCYTES ABSOLUTE: 0.6 THOU/MM3 (ref 0.4–1.3)
NUCLEATED RED BLOOD CELLS: 0 /100 WBC
PLATELET # BLD: 290 THOU/MM3 (ref 130–400)
PMV BLD AUTO: 9.7 FL (ref 9.4–12.4)
RBC # BLD: 4.69 MILL/MM3 (ref 4.7–6.1)
SEG NEUTROPHILS: 77.2 %
SEGMENTED NEUTROPHILS ABSOLUTE COUNT: 9.2 THOU/MM3 (ref 1.8–7.7)
WBC # BLD: 11.9 THOU/MM3 (ref 4.8–10.8)

## 2019-05-07 PROCEDURE — 99232 SBSQ HOSP IP/OBS MODERATE 35: CPT | Performed by: PHYSICIAN ASSISTANT

## 2019-05-07 PROCEDURE — 6370000000 HC RX 637 (ALT 250 FOR IP): Performed by: PHYSICIAN ASSISTANT

## 2019-05-07 PROCEDURE — 82948 REAGENT STRIP/BLOOD GLUCOSE: CPT

## 2019-05-07 PROCEDURE — 36415 COLL VENOUS BLD VENIPUNCTURE: CPT

## 2019-05-07 PROCEDURE — 85025 COMPLETE CBC W/AUTO DIFF WBC: CPT

## 2019-05-07 RX ORDER — OXYCODONE HYDROCHLORIDE AND ACETAMINOPHEN 5; 325 MG/1; MG/1
1 TABLET ORAL EVERY 4 HOURS PRN
Qty: 28 TABLET | Refills: 0 | Status: SHIPPED | OUTPATIENT
Start: 2019-05-07 | End: 2019-05-14

## 2019-05-07 RX ORDER — GLIPIZIDE 5 MG/1
5 TABLET ORAL
Qty: 60 TABLET | Refills: 3 | Status: SHIPPED | OUTPATIENT
Start: 2019-05-08

## 2019-05-07 RX ORDER — GLIPIZIDE 5 MG/1
5 TABLET ORAL
Status: DISCONTINUED | OUTPATIENT
Start: 2019-05-08 | End: 2019-05-07 | Stop reason: HOSPADM

## 2019-05-07 RX ORDER — CYCLOBENZAPRINE HCL 10 MG
10 TABLET ORAL 3 TIMES DAILY PRN
Qty: 50 TABLET | Refills: 0 | Status: SHIPPED | OUTPATIENT
Start: 2019-05-07 | End: 2019-05-17

## 2019-05-07 RX ADMIN — INSULIN LISPRO 4 UNITS: 100 INJECTION, SOLUTION INTRAVENOUS; SUBCUTANEOUS at 08:23

## 2019-05-07 RX ADMIN — OXYCODONE HYDROCHLORIDE AND ACETAMINOPHEN 2 TABLET: 5; 325 TABLET ORAL at 12:31

## 2019-05-07 RX ADMIN — DOCUSATE SODIUM 100 MG: 100 CAPSULE, LIQUID FILLED ORAL at 08:23

## 2019-05-07 RX ADMIN — ACETAMINOPHEN 650 MG: 325 TABLET ORAL at 11:45

## 2019-05-07 RX ADMIN — INSULIN LISPRO 4 UNITS: 100 INJECTION, SOLUTION INTRAVENOUS; SUBCUTANEOUS at 12:07

## 2019-05-07 RX ADMIN — PIOGLITAZONE 30 MG: 30 TABLET ORAL at 08:23

## 2019-05-07 RX ADMIN — OXYCODONE HYDROCHLORIDE AND ACETAMINOPHEN 2 TABLET: 5; 325 TABLET ORAL at 08:23

## 2019-05-07 RX ADMIN — OXYCODONE HYDROCHLORIDE AND ACETAMINOPHEN 2 TABLET: 5; 325 TABLET ORAL at 03:23

## 2019-05-07 ASSESSMENT — PAIN DESCRIPTION - PAIN TYPE
TYPE: SURGICAL PAIN
TYPE: ACUTE PAIN

## 2019-05-07 ASSESSMENT — PAIN SCALES - GENERAL
PAINLEVEL_OUTOF10: 9
PAINLEVEL_OUTOF10: 6
PAINLEVEL_OUTOF10: 6
PAINLEVEL_OUTOF10: 4

## 2019-05-07 ASSESSMENT — PAIN DESCRIPTION - ONSET: ONSET: ON-GOING

## 2019-05-07 ASSESSMENT — PAIN DESCRIPTION - FREQUENCY: FREQUENCY: CONTINUOUS

## 2019-05-07 ASSESSMENT — PAIN DESCRIPTION - ORIENTATION: ORIENTATION: ANTERIOR;RIGHT;LEFT

## 2019-05-07 ASSESSMENT — PAIN DESCRIPTION - DESCRIPTORS
DESCRIPTORS: DISCOMFORT
DESCRIPTORS: ACHING;DISCOMFORT;SORE;TIGHTNESS

## 2019-05-07 ASSESSMENT — PAIN DESCRIPTION - LOCATION
LOCATION: HEAD
LOCATION: NECK;SHOULDER

## 2019-05-07 NOTE — PROGRESS NOTES
Hospitalist Progress Note    Patient:  Hannah Torres      Unit/Bed:7K-02/002-A    YOB: 1975    MRN: [id number]       Acct: [de-identified]     PCP: Zahra Juarez MD    Date of Admission: 5/6/2019    Assessment/Plan:    1. Type 2 DM, uncontrolled    - Blood glucose has not been controlled since October.    - Currently on SSI and Carb Control Diet    - Will talk to Pharmacy about most cost effective anti glycemic medication for discharge. 2. Cervical Spine Stenosis s/p C6-C7 ACDF    - Ortho primary     3. Morbid Obesity    - BMI: 44.7    Expected discharge date: Today     Disposition:    [x] Home       [] TCU       [] Rehab       [] Psych       [] SNF       [] [place]       [] Other-    Chief Complaint: Type 2 DM     Hospital Course: Pt was admitted on 5/6 for back surgery by Dr. Martel Officer. Pt plans to be discharged today. Blood glucose control has been a problem for months. Subjective (past 24 hours): Pt states that he has had glucose issues since October. His PCP controls his DM. He understands the importance of glucose control especially with healing from surgery. Dietician consulted for diet recommendations.        Medications:  Reviewed    Infusion Medications    sodium chloride 125 mL/hr at 05/06/19 1457    dextrose       Scheduled Medications    pioglitazone  30 mg Oral Daily    sodium chloride flush  10 mL Intravenous 2 times per day    docusate sodium  100 mg Oral BID    insulin lispro  0-12 Units Subcutaneous TID WC    insulin lispro  0-6 Units Subcutaneous Nightly     PRN Meds: sodium chloride flush, ondansetron, acetaminophen, oxyCODONE-acetaminophen **OR** oxyCODONE-acetaminophen, HYDROmorphone **OR** HYDROmorphone, cyclobenzaprine, polyethylene glycol, bisacodyl, glucose, dextrose, glucagon (rDNA), dextrose      Intake/Output Summary (Last 24 hours) at 5/7/2019 1025  Last data filed at 5/7/2019 2734  Gross per 24 hour   Intake 3378.64 ml   Output 2630 ml Net 748.64 ml       Diet:  DIET CARB CONTROL; Dietary Nutrition Supplements: Other Oral Supplement (see comment)    Exam:  /61   Pulse 76   Temp 97.8 °F (36.6 °C) (Oral)   Resp 20   Ht 5' 7\" (1.702 m)   Wt 285 lb (129.3 kg)   SpO2 92%   BMI 44.64 kg/m²     General appearance: No apparent distress, appears stated age and cooperative. HEENT: Pupils equal, round, and reactive to light. Conjunctivae/corneas clear. Neck: Neck Brace on. Respiratory:  Normal respiratory effort on RA. Clear to auscultation, bilaterally without Rales/Wheezes/Rhonchi. Cardiovascular: Regular rate and rhythm with normal S1/S2 without murmurs, rubs or gallops. Abdomen: Soft, non-tender, non-distended with normal bowel sounds. Musculoskeletal: passive and active ROM x 4 extremities. Skin: Skin color, texture, turgor normal.  No rashes or lesions. Neurologic:  Neurovascularly intact without any focal sensory/motor deficits. Cranial nerves: II-XII intact, grossly non-focal.  Psychiatric: Alert and oriented, thought content appropriate, normal insight  Capillary Refill: Brisk,< 3 seconds   Peripheral Pulses: +2 palpable, equal bilaterally       Labs:   Recent Labs     05/07/19  0723   WBC 11.9*   HGB 13.7*   HCT 41.7*        No results for input(s): NA, K, CL, CO2, BUN, CREATININE, CALCIUM, PHOS in the last 72 hours. Invalid input(s): MAGNES  No results for input(s): AST, ALT, BILIDIR, BILITOT, ALKPHOS in the last 72 hours. No results for input(s): INR in the last 72 hours. No results for input(s): Abebe Liz in the last 72 hours. Microbiology:      Urinalysis:    No results found for: Seamus Orion, BACTERIA, RBCUA, BLOODU, SPECGRAV, Annalisa [place] [address]    Radiology:  CT CERVICAL SPINE WO CONTRAST   Final Result       Status post anterior cervical fusion and discectomy at the C6-7 level. The hardware appears well-positioned.  There is a small amount of residual posterior osteophyte and uncovertebral joint degenerative changes. There may be some residual spinal canal    stenosis. There is likely moderate residual left foraminal stenosis due to the uncovertebral joint degenerative changes. There is likely mild right foraminal stenosis at this level. A cervical spine MRI may be helpful for further evaluation. **This report has been created using voice recognition software. It may contain minor errors which are inherent in voice recognition technology. **      Final report electronically signed by Dr. Karon Ortega on 5/6/2019 4:02 PM      XR Cervical Spine 1 VW   Final Result      Moderately limited lateral view was obtained. **This report has been created using voice recognition software. It may contain minor errors which are inherent in voice recognition technology. **      Final report electronically signed by Dr. Negin Hutchins on 5/6/2019 2:48 PM      FLUORO FOR SURGICAL PROCEDURES   Final Result          DVT prophylaxis: Defer to Ortho      Code Status: Full Code    PT/OT Eval Status: Active & Ongoing     Tele:   [] yes             [x] no    Active Hospital Problems    Diagnosis Date Noted    Cervical spinal stenosis [M48.02] 05/06/2019       Electronically signed by [employer], PA on 5/7/2019 at 10:25 AM

## 2019-05-07 NOTE — CARE COORDINATION
5/7/19, 10:54 AM      Hannah Torres       Admitted from: PACU 5/6/2019/ Kim 112 day: 1   Location: -02/002-A Reason for admit: Cervical spinal stenosis [M48.02] Status: inpatient  Admit order signed?: yes  PMH:  has a past medical history of Diabetes mellitus ([address] Procedure:  5/6/19 surgery by Dr Martel Officer: C6-C7  ACDF   Pertinent abnormal Imaging:no  Medications:  Scheduled Meds:   [START ON 5/8/2019] glipiZIDE  5 mg Oral QAM AC    pioglitazone  30 mg Oral Daily    sodium chloride flush  10 mL Intravenous 2 times per day    docusate sodium  100 mg Oral BID    insulin lispro  0-12 Units Subcutaneous TID WC    insulin lispro  0-6 Units Subcutaneous Nightly     Continuous Infusions:   sodium chloride 125 mL/hr at 05/06/19 1457    dextrose        Pertinent Info/Orders/Treatment Plan:  Cervical collar. I&O. N/V checks. Pain management. Monitor wound drain output. Diet: DIET CARB CONTROL; Dietary Nutrition Supplements: Other Oral Supplement (see comment)   Smoking status:  reports that he has never smoked. He has never used smokeless tobacco.   PCP: Zahra Juarez MD  Readmission: no  Readmission Risk Score: 7%    Discharge Planning  Current Residence:  Private Residence  Living Arrangements:  Spouse/Significant Other   Support Systems:  Spouse/Significant Other  Current Services PTA:     Potential Assistance Needed:  [address] Medications:  No  Does patient want to participate in local refill/ meds to beds program?  Yes  Type of Home Care Services:  Nursing Services  Patient expects to be discharged to:  home  Expected Discharge date:  05/07/19  Follow Up Appointment: Best Day/ Time: Wednesday PM    Discharge Plan: Brandon Garcia said he is planning to go home today. I discussed with Brandon Garcia and his wife that Dr Martel Officer uses [facility] for wound drain management. They chose [facility] and I called referral to [facility].  No other discharge needs voiced     Evaluation: no  5/7/19, 11:11 AM    Discharge plan discussed by  and . Discharge plan reviewed with patient/ family. Patient/ family verbalize understanding of discharge plan and are in agreement with plan. Understanding was demonstrated using the teach back method.    Services After Discharge  Services At/After Discharge: Nursing Services

## 2019-05-07 NOTE — CONSULTS
Consults   Nutrition Education    Type and Reason for Visit: Initial, Consult, Patient Education(Uncontrolled Diabetes)    Provided patient and spouse with Heart Healthy Carbohydrate Controlled diet education. Reviewed patient's HgA1C of 10.9% on 5/6/19. Pt states he usually only consumes x2 meals daily, skipping breakfast. Discussed importance of consuming three meals daily, consuming a consistent amount of carbohydrates with each meal, and how many carbohydrates to consume with each meal. Educated patient on how to count carbohydrates when going home and how to read a nutrition facts label. Provided examples of carbohydrate controlled meals. Discussed what protein and fats are heart healthy. Answered all questions and concerns at this time. · Verbally reviewed following information with Patient: Completed Heart Healthy Carbohydrate Controlled Diet Education on 5/7/19. · Written educational materials provided. · Contact name and number provided. · Refer to Patient Education activity for more details.     Electronically signed by Yudy Kolb RD, LD on 5/7/19 at 11:08 AM    Contact Number: [phone]

## 2019-05-07 NOTE — PROGRESS NOTES
Department of Orthopedic Surgery  Spine Service  Cisco Morris PA-C Progress Note        Subjective:  Pt resting in bed feeling well. Good relief of LUE pain and radiculopathy. Had some nausea and emesis yesterday with pain medication but feeling well this AM. Ready to go home today. Vitals  VITALS:  /76   Pulse 59   Temp 98 °F (36.7 °C) (Oral)   Resp 19   Ht 5' 7\" (1.702 m)   Wt 285 lb (129.3 kg)   SpO2 92%   BMI 44.64 kg/m²   24HR INTAKE/OUTPUT:      Intake/Output Summary (Last 24 hours) at 5/7/2019 0701  Last data filed at 5/7/2019 2193  Gross per 24 hour   Intake 4628.64 ml   Output 2680 ml   Net 1948.64 ml     URINARY CATHETER OUTPUT (Pelaez):     DRAIN/TUBE OUTPUT:  Closed/Suction Drain Anterior Neck Bulb 7 [place]-Output (ml): 12.5 ml      PHYSICAL EXAM:    Orientation:  alert and oriented to person, place and time    Incision:  dressing in place, clean, dry, intact    Upper Extremity Motor :   Deltoid:  5/5  Biceps:  5/5  Triceps:  5/5  Wrist Flexion:  5/5  Wrist Extension:  5/5  Finger Flexion:  5/5  Finger Extension:  5/5    Upper Motor Neuron Signs:  None  Upper Extremity Sensory:  Intact C3-T1 distribution  Flatus:  positive    ABNORMAL EXAM FINDINGS:  none    LABS:    HgB:  No results found for: HGB      ASSESSMENT AND PLAN:    Post operative day 1     1:  Monitor labs and drain output  2:  Activity Level:  OOB with therapy   3:  Pain Control:  Controlled  4:  Discharge Planning:  Home today with [facility] for drains.

## 2019-05-07 NOTE — OP NOTE
radicular in nature,  giving him tremendous difficulty with the use of the left arm and hand. These are symptoms which have given him quite a bit of difficulty nor  for couple of months, intermittent, unimproved despite conservative  management. Because of these continued symptoms, I recommended that was  consider disk surgery with the diskectomy and interbody arthroplasty. I  discussed with the patient prior to surgery the operative technique,  risks and benefits, postop care as well, and have answered all of her  questions on today's date. He is agreeable to proceed with the  operation. As was found during the operation, the patient has a  significant girth of the neck and shoulder region, which would not allow  the interbody C-arm imaging of this disk space to allow accurate  placement and sizing of our arthroplasty. Therefore, a fusion was  chosen and will be described. Nonetheless, the risks and benefits were  discussed prior to surgery and included, but not limited to infection,  anesthesia, nerve injury, CSF leak, postop pain, swallowing, dysphagia,  change of voice, and adjacent level disease in the future. DESCRIPTION OF PROCEDURE:  We brought the patient to the operating room  where upon entry, a timeout was observed. His anesthetic was delivered,  airway secured. Pelaez catheter had not been used. He was positioned  supine. With gentle extension of the head and neck area, prep and drape  would then be completed with the use of a soap scrub solution, sterile  toweling, and a ChloraPrep solution as well. We applied sterile  sheeting, Ana Laura Hernandezellen as well, and marked the anterior neck at the greater  tubercle location and then going leftward with a curvilinear angel above  the manubrium sterni measuring 2 inch length of incision. I injected a  total of 3 mL of 1% lidocaine with epinephrine.   He received 2 gm of IV  Ancef at the completion and we would then incise skin, maintain  hemostasis, and expose the anterior neck incision, maintained  hemostasis, divided platysmal layer to retract soft tissues, divided the  platysmal layer and the pretracheal fascia to retract the esophagus and  trachea rightward, and sternocleidomastoid muscle, carotid sheath, and  omohyoid muscle were taken leftward. This exposed the levels of the  C6-C7. We marked the level of C5-C6. X-ray were taken. We then began  by placing [place] pins at the levels of C6-C7 below this and set the  retractors, did a repeat x-ray that would confirm the level of C6-C7. We would then bring the microscope in and perform a complete anterior  cervical diskectomy. The canal was thoroughly cleared. Endplates were  also well decorticated with the use of a heron. All posterior  osteophytes and disk herniation were taken as well posteriorly. We  brought the C-arm in as well for sizing of the trial, but realized that  there was no visibility of the posterior endplate of the level of C6-C7  because of the girth of the shoulders and the low level of the level of  the C6-C7. We therefore elected to abort the option of an arthroplasty  in preference of a fusion with bone graft and would not require  placement of posterior endplate across the U1-H8 disk space level and  therefore, the 7 x 14 mm bone graft was selected and filled with the use  of the [employer] DBM bone graft putty. This fitted very well. It was  tamped into place. We relaxed the distraction pins, placed wax in the  holes in plate levels of J3-A6 with the use of a 21 mm [employer]  Atlantis Elite plate. Two screws were used in the level of C6, two in  C7. Each locking mechanism would be engaged as well. This fit very  well to plate  to bone, and each mechanism would be engaged to lock  both upper and lower sides of the plate. We irrigated thoroughly,  suctioned dry, and we proceeded to place the drain, closed in layers,  and finished with layered closure.   Intraoperative x-rays at the  completion of the procedure were very difficult to visualize at the  level of C6-C7 because of the girth of the cervical spine and shoulders. We therefore finished our dressing application sewing the drain tube in  with plans to obtain a CT scan postoperatively and would review this  later in the day. Otherwise, the patient was very well throughout the  operation without difficulty or any further complications. My first  assistant was also Chelo Selby, [facility], as well throughout the operation. Due to the patient's body mass index of 44.7, his operative technique  and time of operation was also 50% more than usual.    Chelo Selby, PAC, assisted throughout the procedure with positioning,  draping, retraction, wound closure, dressing, and splint application.         Tiffanie Rosenbaum M.D.    D: 05/06/2019 9:09:04       T: 05/06/2019 14:57:41     [email or web address]  Job#: [id number]     Doc#: [id number]    CC:

## (undated) DEVICE — 4-PORT MANIFOLD: Brand: NEPTUNE 2

## (undated) DEVICE — LIMB HOLDER, WRIST/ANKLE: Brand: DEROYAL

## (undated) DEVICE — GLOVE ORANGE PI 7   MSG9070

## (undated) DEVICE — SUTURE ETHLN SZ 3-0 L18IN NONABSORBABLE BLK FS-1 L24MM 3/8 663H

## (undated) DEVICE — SKIN AFFIX SURG ADHESIVE 72/CS 0.55ML: Brand: MEDLINE

## (undated) DEVICE — GLOVE SURG SZ 9 THK91MIL LTX FREE SYN POLYISOPRENE ANTI

## (undated) DEVICE — Device

## (undated) DEVICE — DRAPE MICSCP W132XL406CM LENS DIA68MM W VARI LENS2 FOR LEICA

## (undated) DEVICE — EVACUATOR SURG 100CC SIL BLB SUCT RESVR FOR CLS WND DRNGE

## (undated) DEVICE — 3M™ BAIR HUGGER® MULTI ACCESS BLANKET, PEDIATRIC, FULL BODY, 10 PER CASE 31000: Brand: BAIR HUGGER™

## (undated) DEVICE — SUTURE VCRL SZ 3-0 L27IN ABSRB UD FS-2 L19MM 1/2 CIR J423H

## (undated) DEVICE — STRIP,CLOSURE,WOUND,MEDI-STRIP,1/2X4: Brand: MEDLINE

## (undated) DEVICE — SUTURE VCRL SZ 1 L27IN ABSRB UD CT-1 L36MM 1/2 CIR J261H

## (undated) DEVICE — 1010 S-DRAPE TOWEL DRAPE 10/BX: Brand: STERI-DRAPE™

## (undated) DEVICE — GLOVE ORANGE PI 8   MSG9080

## (undated) DEVICE — DRILL BIT 6975150 FLUTELESS DRILL BIT

## (undated) DEVICE — DRAPE C ARM W36XL30IN RECTANG BND BG AND TAPE

## (undated) DEVICE — Device: Brand: FABCO

## (undated) DEVICE — FLOSEAL HEMOSTATIC MATRIX, 5 ML: Brand: FLOSEAL

## (undated) DEVICE — TAPE,CLOTH/SILK,CURAD,3"X10YD,LF,40/CS: Brand: CURAD

## (undated) DEVICE — 3M™ STERI-STRIP™ COMPOUND BENZOIN TINCTURE 40 BAGS/CARTON 4 CARTONS/CASE C1544: Brand: 3M™ STERI-STRIP™

## (undated) DEVICE — COLLAR CERV M W4XL21IN FIRM FOAM SERP STYL WSH

## (undated) DEVICE — COLLAR CERV SERP CNTOUR L235XH5IN L

## (undated) DEVICE — GLOVE ORANGE PI 7 1/2   MSG9075

## (undated) DEVICE — Z DISCONTINUED APPLICATOR SURG PREP 0.35OZ 2% CHG 70% ISO ALC W/ HI LT

## (undated) DEVICE — CODMAN® SURGICAL PATTIES 1/2" X 1/2" (1.27CM X 1.27CM): Brand: CODMAN®

## (undated) DEVICE — BUR CUT RND FLUT AGRSV 4.0MM

## (undated) DEVICE — BLADE CLIPPER GEN PURP NS

## (undated) DEVICE — GOWN,SIRUS,NON REINFRCD,LARGE,SET IN SL: Brand: MEDLINE

## (undated) DEVICE — 3M™ IOBAN™ 2 ANTIMICROBIAL INCISE DRAPE 6650EZ: Brand: IOBAN™ 2

## (undated) DEVICE — SOLUTION IV 1000ML 0.9% SOD CHL PH 5 INJ USP VIAFLX PLAS

## (undated) DEVICE — DRAIN SURG FLAT W7MMXL20CM FULL PERF

## (undated) DEVICE — GLOVE ORANGE PI 8 1/2   MSG9085

## (undated) DEVICE — GOWN,SIRUS,NONRNF,SETINSLV,XL,20/CS: Brand: MEDLINE

## (undated) DEVICE — DRESSING TRNSPAR W5XL4.5IN FLM SHT SEMIPERMEABLE WIND

## (undated) DEVICE — E-Z CLEAN, NON-STICK, PTFE COATED, ELECTROSURGICAL BLADE ELECTRODE, MODIFIED EXTENDED INSULATION, 2.5 INCH (6.35 CM): Brand: MEGADYNE

## (undated) DEVICE — SUTURE MCRYL SZ 4-0 L27IN ABSRB UD L19MM PS-2 1/2 CIR PRIM Y426H

## (undated) DEVICE — PAD,O.R.,TABLE,CONV FOAM,2X20X72: Brand: MEDLINE